# Patient Record
Sex: FEMALE | Race: WHITE | NOT HISPANIC OR LATINO
[De-identification: names, ages, dates, MRNs, and addresses within clinical notes are randomized per-mention and may not be internally consistent; named-entity substitution may affect disease eponyms.]

---

## 2020-11-17 ENCOUNTER — DASHBOARD ENCOUNTERS (OUTPATIENT)
Age: 55
End: 2020-11-17

## 2020-11-17 ENCOUNTER — LAB OUTSIDE AN ENCOUNTER (OUTPATIENT)
Dept: URBAN - METROPOLITAN AREA CLINIC 118 | Facility: CLINIC | Age: 55
End: 2020-11-17

## 2020-11-17 ENCOUNTER — OFFICE VISIT (OUTPATIENT)
Dept: URBAN - METROPOLITAN AREA CLINIC 118 | Facility: CLINIC | Age: 55
End: 2020-11-17
Payer: MEDICAID

## 2020-11-17 DIAGNOSIS — K83.9 BILE DUCT ABNORMALITY: ICD-10-CM

## 2020-11-17 DIAGNOSIS — K85.90 PANCREATITIS: ICD-10-CM

## 2020-11-17 PROBLEM — 105997008 DISORDER OF BILIARY TRACT: Status: ACTIVE | Noted: 2020-11-17

## 2020-11-17 PROCEDURE — 99204 OFFICE O/P NEW MOD 45 MIN: CPT | Performed by: INTERNAL MEDICINE

## 2020-11-17 NOTE — HPI-TODAY'S VISIT:
pt recent admission for gallstone pancreatitis s/p lap brittney presents for follow up. Pt reports when had surgery was told bile ducts looked abnormal and referred to GI. Reports feeling of mild abdominal pain she feels is related to resolving pancreatitis. Denies nausea, vomiting, gerd or dysphagia. pt reports was told in the past that she may have PUD and shoudl consider EGD. Denies LGI symptoms and reports colonoscopy negative within last year. Denies other complaints.

## 2020-12-01 ENCOUNTER — LAB OUTSIDE AN ENCOUNTER (OUTPATIENT)
Dept: URBAN - METROPOLITAN AREA CLINIC 118 | Facility: CLINIC | Age: 55
End: 2020-12-01